# Patient Record
Sex: FEMALE | Race: OTHER | Employment: STUDENT | URBAN - METROPOLITAN AREA
[De-identification: names, ages, dates, MRNs, and addresses within clinical notes are randomized per-mention and may not be internally consistent; named-entity substitution may affect disease eponyms.]

---
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
## 2025-02-24 ENCOUNTER — PATIENT MESSAGE (OUTPATIENT)
Dept: OBGYN CLINIC | Facility: CLINIC | Age: 20
End: 2025-02-24

## 2025-02-25 NOTE — TELEPHONE ENCOUNTER
Patient sent message stating she can not make 3/8/25 appointment, patient requesting earlier in the week or the week of 3/10    To MA supervisor Roula

## 2025-02-25 NOTE — TELEPHONE ENCOUNTER
Dr. Nunn,   Patient is unable to make appointment time on 3/8. She is requesting either earlier in the week or the following week. Only New OB appointments available or AM 3/12 while on call. Please advise where patient can be added.

## 2025-03-06 ENCOUNTER — OFFICE VISIT (OUTPATIENT)
Dept: OBGYN CLINIC | Facility: CLINIC | Age: 20
End: 2025-03-06

## 2025-03-06 VITALS
HEIGHT: 64 IN | HEART RATE: 77 BPM | WEIGHT: 161 LBS | DIASTOLIC BLOOD PRESSURE: 83 MMHG | BODY MASS INDEX: 27.49 KG/M2 | SYSTOLIC BLOOD PRESSURE: 135 MMHG

## 2025-03-06 DIAGNOSIS — Z01.411 ENCOUNTER FOR GYNECOLOGICAL EXAMINATION WITH ABNORMAL FINDING: Primary | ICD-10-CM

## 2025-03-06 DIAGNOSIS — Z30.41 ORAL CONTRACEPTIVE PILL SURVEILLANCE: ICD-10-CM

## 2025-03-06 PROCEDURE — 99395 PREV VISIT EST AGE 18-39: CPT | Performed by: OBSTETRICS & GYNECOLOGY

## 2025-03-06 PROCEDURE — 3075F SYST BP GE 130 - 139MM HG: CPT | Performed by: OBSTETRICS & GYNECOLOGY

## 2025-03-06 PROCEDURE — 3008F BODY MASS INDEX DOCD: CPT | Performed by: OBSTETRICS & GYNECOLOGY

## 2025-03-06 PROCEDURE — 3079F DIAST BP 80-89 MM HG: CPT | Performed by: OBSTETRICS & GYNECOLOGY

## 2025-03-06 RX ORDER — DROSPIRENONE AND ETHINYL ESTRADIOL 0.03MG-3MG
1 KIT ORAL DAILY
Qty: 112 TABLET | Refills: 3 | Status: SHIPPED | OUTPATIENT
Start: 2025-03-06

## 2025-03-06 NOTE — PROGRESS NOTES
Felicity Martinez is a 19 year old female  Patient's last menstrual period was 2025 (exact date).   Chief Complaint   Patient presents with    Gyn Exam     Annual, ocp refill.     Reviewed Preventative/Wellness form with patient.   .  History of Present Illness  Felicity Martinez is a 19 year old female who presents for annual gyne exam & refill of her birth control pills.    She experiences severe headaches and nausea, which she describes as migraines, typically occurring before or during her menstrual period.  No visual disturbances are associated with the headaches, which she describes as 'just a bad headache' accompanied by 'really bad nausea.'    The symptoms occur specifically during the week of her period, suggesting a possible link to hormonal changes. She questions if the symptoms could be related to her birth control pill. She has been on the same birth control pill without any changes from the pharmacy, and she reports that it is working well.    She is a psychology major and works as a  at [employer]. She has a background in dance, having danced for fourteen years, particularly enjoying contemporary and jazz.       OBSTETRICS HISTORY:     OB History    Para Term  AB Living   0 0 0 0 0 0   SAB IAB Ectopic Multiple Live Births   0 0 0 0 0       GYNE HISTORY:     Periods regular monthly      BCM:  OCP    History   Sexual Activity    Sexual activity: Not Currently                    No data to display                  MEDICAL HISTORY:     Past Medical History:    Asthma (HCC)    Astigmatism    OU     Past Surgical History:   Procedure Laterality Date    Other surgical history      R elbow surgery     OB History    Para Term  AB Living   0 0 0 0 0 0   SAB IAB Ectopic Multiple Live Births   0 0 0 0 0        SOCIAL HISTORY:     Tobacco Use: Low Risk  (3/6/2025)    Patient History     Smoking Tobacco Use: Never     Smokeless Tobacco  Use: Never     Passive Exposure: Not on file       FAMILY HISTORY:     Family History   Problem Relation Age of Onset    Hypertension Father     Hypertension Maternal Grandmother     Pancreatic Cancer Maternal Grandfather     Diabetes Paternal Grandmother     Diabetes Paternal Grandfather     Breast Cancer Other     Heart Disorder Neg     Glaucoma Neg        MEDICATIONS:       Current Outpatient Medications:     drospirenone-ethinyl estradiol 3-0.03 MG Oral Tab, Take 1 tablet by mouth daily. Skip placebo pills for first 3 packs & take w/ fourth, Disp: 112 tablet, Rfl: 3    Fexofenadine-Pseudoephed -240 MG Oral Tablet 24 Hr, Take 1 tablet by mouth daily. (Patient not taking: Reported on 3/6/2025), Disp: , Rfl:     ALLERGIES:     Allergies[1]      REVIEW OF SYSTEMS:     Constitutional:    denies fever / chills  Eyes:     denies blurred or double vision  Cardiovascular:  denies chest pain or palpitations  Respiratory:    denies shortness of breath  Gastrointestinal:  denies severe abdominal pain, frequent diarrhea or constipation, nausea / vomiting  Genitourinary:    denies dysuria, bothersome incontinence  Skin/Breast:   denies any breast pain, lumps, or discharge  Neurological:    denies frequent severe headaches  Psychiatric:   denies depression or anxiety, thoughts of harming self or others  Heme/Lymph:    denies easy bruising or bleeding      PHYSICAL EXAM:     Blood pressure 135/83, pulse 77, height 5' 4\" (1.626 m), weight 161 lb (73 kg), last menstrual period 03/06/2025, not currently breastfeeding.  Physical Exam  GENITOURINARY: Uterus normal on bimanual exam  Constitutional:  well developed, well nourished  Head/Face:  normocephalic  Neck/Thyroid: thyroid symmetric, no thyromegaly, no nodules, no adenopathy  Lymphatic: no abnormal supraclavicular or axillary adenopathy is noted  Breast:   normal without palpable masses, tenderness, asymmetry, nipple discharge, nipple retraction or skin changes  Abdomen:    soft, nontender, nondistended, no masses  Skin/Hair:  no unusual rashes or bruises  Extremities:  no edema, no cyanosis  Psychiatric:   oriented to time, place, person and situation. Appropriate mood and affect    Pelvic Exam:  External Genitalia:  normal appearance, hair distribution, and no lesions  Urethral Meatus:   normal in size, location, without lesions and prolapse  Bladder:    no fullness, masses or tenderness  Vagina:    normal appearance without lesions, no abnormal discharge  Cervix:    normal without tenderness on motion  Uterus:    normal in size, contour, position, mobility, without tenderness  Adnexa:   normal without masses or tenderness  Perineum:   normal  Anus: no hemorroids     Results        ASSESSMENT & PLAN:     Felicity was seen today for gyn exam.    Diagnoses and all orders for this visit:    Encounter for gynecological examination with abnormal finding    Oral contraceptive pill surveillance    Other orders  -     drospirenone-ethinyl estradiol 3-0.03 MG Oral Tab; Take 1 tablet by mouth daily. Skip placebo pills for first 3 packs & take w/ fourth        Assessment & Plan  Menstrual Migraines    Experiencing severe headaches and nausea typically before or during menstruation, likely due to hormonal shifts. No visual disturbances. Discussed continuous birth control to reduce migraine frequency.    - Switch to continuous birth control, skipping the sugar pill week for three consecutive packs and taking sugar pills in the fourth pack to reduce migraines to four times a year.    -  four packs of birth control pills from the pharmacy and label them 1, 2, 3, and 4, with the fourth pack for sugar pills.      Sexual Health    Sexually active with no current concerns. Pelvic exam performed, ovaries normal.    - Pelvic exam performed, ovaries normal.      General Health Maintenance    On birth control pills, effective. Received Gardasil vaccine. No changes in medical or family history. Pap  smears to start at age 21. Discussed STD testing, declined. Advised to notify if period needs to be skipped for future Pap smear appointments.    - Continue current birth control regimen.    - No Pap smear needed today due to age  - Notify if period needs to be skipped for future Pap smear appointments.      SUMMARY:  PAP:  next pap at 21 per ASCCP guidelines.  BCM: OCP  STD screening: declines, condoms encouraged  Gardasil: received  HM updated  Depression screen:   Depression Screening (PHQ-2/PHQ-9): Over the LAST 2 WEEKS   Little interest or pleasure in doing things (over the last two weeks)?: Not at all    Feeling down, depressed, or hopeless (over the last two weeks)?: Not at all    PHQ-2 SCORE: 0          FOLLOWUP:     Return in about 1 year (around 3/6/2026) for annual gyne exam, medication refill.    Note to patient and family:  The 21st Century Cures Act makes medical notes available to patients in the interest of transparency.  However, please be advised that this is a medical document.  It is intended as a peer to peer communication.  It is written in medical language and may contain abbreviations or verbiage that are technical and unfamiliar.  It may appear blunt or direct.  Medical documents are intended to carry relevant information, facts as evident, and the clinical opinion of the practitioner.       [1]   Allergies  Allergen Reactions    Seasonal ITCHING     Eyes and palms and stuffy nose

## 2025-03-06 NOTE — PROGRESS NOTES
The following individual(s) verbally consented to be recorded using ambient AI listening technology and understand that they can each withdraw their consent to this listening technology at any point by asking the clinician to turn off or pause the recording:    Patient name: Felicity Martinez

## (undated) NOTE — LETTER
4/18/2023              Grant Cannon        [address]         To Whom It May Concern,      The patient should be excused from gym until cleared by the specialist she will be seeing soon. Thank you.       Sincerely,       Cleo Deleon MD  [facility], [address], [address]  [phone]        Document electronically generated by:  Cleo Deleon MD

## (undated) NOTE — LETTER
8/14/2019              Shae Tse        [address]        [facility] [address]       SCHOOL MEDICATION PERMISSION FORM    SCHOOL DISTRICT:      TO BE COMPLETED IN DETAIL BY THE PARENT/GUARDIAN:    STUDENT'S NAME:  Elías Hartman

## (undated) NOTE — MR AVS SNAPSHOT
[address], [address] [facility]  [address] [facility] [address] [facility]  [phone]               Thank you for choosing us for your health care visit with Umm Randhawa MD.  We are glad to serve you and happy to provide yo Inhale 2 puffs by inhalation route every 4 hours as needed. Please dispense for 90 day supply.    Commonly known as:  PROAIR HFA           * albuterol sulfate (2.5 MG/3ML) 0.083% Nebu   Take 3 mL (2.5 mg total) by nebulization every 4 (four) hours as needed

## (undated) NOTE — LETTER
Date: 10/25/2023    Patient Name: Silvio Fortune      To Whom it may concern: This letter has been written at the patient's request. The above patient was seen at the [facility] for treatment of a medical condition. This patient can return school and extracurricular activities, as tolerated, with no restrictions. Sincerely,          VITO Curran, PATRICE Orthopedic Surgery / Sports Medicine Specialist  [facility]  [address], [address]   [email or web address]  [email or web address]  t: [phone]  o: [phone]  f: [phone]        This note was dictated using Dragon software. While it was briefly proofread prior to completion, some grammatical, spelling, and word choice errors due to dictation may still occur.

## (undated) NOTE — LETTER
Name:  Carlos Rodrigues Year:  8th Grade Class: Student ID No.:   Address:  [address] [place] [address] Phone:  [phone] (home)  :  15year old   Name Relationship [address] Work Phone Home Phone Mobile Phone   1.  [facility] unexpected or unexplained sudden death before age 48?     15. Does anyone in your family have hypertrophic cardiomyopathy, Marfan syndrome, arrhythmogenic right ventricular cardiomyopathy, long QT syndrome, short QT syndrome, Brugada syndrome, or catechola 29. Have you ever had a head injury or concussion? 35. Have you ever had a hit or blow to the head that caused confusion, prolonged headache, or memory problems? 36. Do you have a history of seizure disorder?     37.  Do you have headaches with exer Vision: R 20/    L 20/   Corrected:   Yes/No   MEDICAL NORMAL ABNORMAL FINDINGS   Appearance:  Marfan stigmata (kyphoscoliosis, high-arched palate, pectus excavatum,      arachnodactyly, arm span > height, hyperlaxity, myopia, MVP, aortic insufficiency) Kyra Boxer (This section for high school students only)   9246-7624 school term     As a prerequisite to participation in [employer] athletic activities, we agree that I/our student will not use performance-enhancing substances as defined in the [employer] Performance-Enhancing

## (undated) NOTE — Clinical Note
VACCINE ADMINISTRATION RECORD  PARENT / GUARDIAN APPROVAL  Date: 6/15/2017  Vaccine administered to: Vanna Laogs     : 2005    MRN: [id number]    A copy of the appropriate Centers for Disease Control and Prevention Vaccine Information statemen

## (undated) NOTE — Clinical Note
[facility] Examination       Student's Name  [place] Birth Da Title                           Date    (If adding dates to the above immunization history section, put your initials by date(s) and sign here.)   ALTERNATIVE PROOF OF IMMUNITY   1 Diagnosis of asthma? Child wakes during the night coughing   Yes   No    Yes   No    Loss of function of one of paired organs? (eye/ear/kidney/testicle)   Yes   No      Birth Defects? Developmental delay? Yes   No    Yes   No  Hospitalizations? When? Signs of Insulin Resistance (hypertension, dyslipidemia, polycystic ovarian syndrome, acanthosis nigricans)                           At Risk     Lead Risk Questionnaire  Req'd for children 6 months thru 6 yrs enrolled in licensed or public Respiratory {YES:829::\"Yes\"}                   Diagnosis of Asthma: {NO:830::\"No\"} Mental Health {YES:829::\"Yes\"}        Currently Prescribed Asthma Medication:            Quick-relief  medication (e.g. Short Acting Beta Antagonist): {NO:830::\"No\"}

## (undated) NOTE — ED AVS SNAPSHOT
Parent/Legal Guardian Access to the Online Si[employer] Record of a Patient 15to 16Years Old  Return completed form by Secure email to [place] HIM/Medical Records Department: [email or web address]     Requirements and Procedures   Under [facility] ·  I understand that [address] is intended as a secure online source of confidential medical information.  If I share my MyChart ID and password with another person, that person may be able to view my or my child’s health information, and health information a · This form does not substitute as an Authorization to Release health information to a designated proxy by any other method. The purpose of this Minor Proxy form is for access to the [employer] portal information.     By signing below, I acknowledge that I ha I have read and understand the requirements and procedures for accessing my child’s medical record information online as provided  on page one of this document titled, Parent/Legal Guardian Access to the Online MyChart Record of a Patient [address] to [address]

## (undated) NOTE — LETTER
?   PREPARTICIPATION PHYSICAL EVALUATION  MEDICAL ELIGIBILITY FORM  [] Medically eligible for all sports without restrictions   [] Medically eligible for all sports without restriction with recommendations for further evaluation or treatment     []Medically Shortness of breath or difficulty breathing Yes  No    d)      Fatigue Yes  No    e)      Muscle or body aches Yes  No    f)       Headache Yes  No    g)      New loss of taste or smell Yes  No    h)      Sore throat Yes  No    i)       Congestion or runny Journal of Sport Medicine ([email or web address])  • Supplemental COVID?19 Questions ([email or web address].[email or web address])  • COVID?19 Interim Guidance: Return to Sports and Physical Activity ([email or web address])    ?   PREPARTICIPATION PHYSICAL EVALUATION   HISTORY FORM  Note: Complete and sign this form (wi sports for any reason? [] [] 3. Do you have any ongoing medical issues or recent illnesses? [] []   HEART HEALTH QUESTIONS ABOUT YOU Yes No 4. Have you ever passed out or nearly passed out during or after exercise? [] [] 5.    Have you ever had discomf after exercise? [] []   17. Are you missing a kidney, an eye, a testicle (males), your spleen, or any other organ? [] []   18. Do you have groin or testicle pain or a painful bulge or hernia in the groin area? [] []   19.    Do you have any recurring sk I hereby state that, to the best of my knowledge, my answers to the questions on this form are complete and correct.     Signature of athlete:____________________________________________________________________________________________  [employer] of tigist of methicillin-resistant Staphylococcus aureus (MRSA), or tinea corporis   [x]  []   Neurological   [x]  []   MUSCULOSKELETAL NORMAL ABNORMAL FINDINGS   Neck   [x]  []    Back   [x]  []   Shoulder and arm   [x]  []     Elbow and forearm   [x]  []     Wrist

## (undated) NOTE — LETTER
3/14/2022              Shawnee Miles         To Whom It May Concern,    Please excuse Yolanda Eugene from gym this week and next week due to back pain from a strain. She can return to gym 3/28. Please call with any questions.           Sincerely,       MD Donn Hill Ap[place]orn, [address] [facility]  [phone]        Document electronically generated by:  John Adams MD

## (undated) NOTE — ED AVS SNAPSHOT
[facility] AND [facility] in [facility] 18.  [address] [facility] [place]    Phone:  [phone]    Fax:  [phone]           Sharon Marvin   MRN: [id number]    Department:  [facility] AND [facility] in [address] [place] [address]   Date of Visit: not participate in your health insurance plan. This may result in a lower benefit level being available to you or other limited reimbursement.   The physician may seek payment directly from you for amounts other than your deductible, co-payment, or co-insu prescription right away and begin taking the medication(s) as directed.   If you believe that any of the medications or instructions on this list is different from what your Primary Care doctor has instructed you - please continue to take your medications a - If you don’t have insurance, Ismael Orta has partnered with [employer] to help you get signed up for insurance coverage.   [employer] [facility] is a Federal Navigator program that can help with your Affordable Care Act cover

## (undated) NOTE — LETTER
VACCINE ADMINISTRATION RECORD  PARENT / GUARDIAN APPROVAL  Date: 2018  Vaccine administered to: Marcell Muir     : 2005    MRN: [id number]    A copy of the appropriate Centers for Disease Control and Prevention Vaccine Information statemen

## (undated) NOTE — LETTER
Date: 10/9/2023    Patient Name: Max New      To Whom it may concern: This letter has been written at the patient's request. The above patient was seen at the [facility] for treatment of a medical condition. This patient can return to dance as tolerated with gradual progression of activities. Sincerely,          Fanny Severance, MMS, PATRICE Orthopedic Surgery / Sports Medicine Specialist  [facility]  [address], [address]   Ivory Del [email or web address]. [email or web address]  t: [phone]  o: [phone]  f: [phone]        This note was dictated using Dragon software. While it was briefly proofread prior to completion, some grammatical, spelling, and word choice errors due to dictation may still occur.

## (undated) NOTE — LETTER
[facility] [employer] of Child Health Examination       Student's Name  Stephanie Berry Birth D Signature                                                                                                                   Title      MD                     Date  8/14/2019   Signature Grade Level/ID#  9th Grade   HEALTH HISTORY          TO BE COMPLETED AND SIGNED BY PARENT/GUARDIAN AND VERIFIED BY HEALTH CARE PROVIDER    ALLERGIES  (Food, drug, insect, other)  Patient has no known allergies.  MEDICATION  (List all prescribed or taken on appropriate personnel for health /educational purposes. Bone/Joint problem/injury/scoliosis?    Yes   No  Parent/Guardian Signature                                          Date     PHYSICAL EXAMINATION REQUIREMENTS    Entire section below to be completed Ears Yes                      Screen result: Gastrointestinal Yes    Eyes Yes     Screen result:   Genito-Urinary Yes  LMP   Nose Yes  Neurological Yes    Throat Yes  Musculoskeletal Yes    Mouth/Dental Yes  Spinal examination Yes    Cardiovascular/HTN Yes Rev 11/15                                                                    Printed by the [employer]

## (undated) NOTE — Clinical Note
5/8/2017              Pilar Mer[address] [place] [address]         To Whom It May Concern,    Rachid Herring may return to full activity in gym and dance beginning tomorrow, 5/9/17.     Sincerely,    Freddy Ayoub MD

## (undated) NOTE — LETTER
VACCINE ADMINISTRATION RECORD  PARENT / GUARDIAN APPROVAL  Date: 3/28/2023  Vaccine administered to: Camille Townsend     : 2005    MRN: [id number]    A copy of the appropriate Centers for Disease Control and Prevention Vaccine Information statement has been provided. I have read or have had explained the information about the diseases and the vaccines listed below. There was an opportunity to ask questions and any questions were answered satisfactorily. I believe that I understand the benefits and risks of the vaccine cited and ask that the vaccine(s) listed below be given to me or to the person named above (for whom I am authorized to make this request). VACCINES ADMINISTERED:  Menveo    I have read and hereby agree to be bound by the terms of this agreement as stated above. My signature is valid until revoked by me in writing. This document is signed by  , relationship: Mother on 3/28/2023.:                                                                                                                                         Parent / Danladonna Lennon                                                Date    Alexandria Lopez served as a witness to authentication that the identity of the person signing electronically is in fact the person represented as signing. This document was generated by Alexandria Lopez on 3/28/2023.

## (undated) NOTE — LETTER
VACCINE ADMINISTRATION RECORD  PARENT / GUARDIAN APPROVAL  Date: 2019  Vaccine administered to: Emmett Nolan     : 2005    MRN: [id number]    A copy of the appropriate Centers for Disease Control and Prevention Vaccine Information statemen

## (undated) NOTE — LETTER
Date: 7/28/2023    Patient Name: Paige Holbrook          To Whom it may concern: This letter has been written at the patient's request. The above patient was seen at the [facility] for treatment of a medical condition. This patient can return to school. Please allow extra time between classes, avoid physical education and sports activity until further notice, avoid crowded hallways, allow elevator use, and assistance with books/lunch and carrying items. Please accommodate accordingly. Sincerely,          VITO Bruce, PABulmaroC Orthopedic Surgery / Sports Medicine Specialist  [facility]  [address], [address]   [email or web address]  [email or web address]  t: [phone]  o: [phone]  f: [phone]          This note was dictated using Dragon software. While it was briefly proofread prior to completion, some grammatical, spelling, and word choice errors due to dictation may still occur.

## (undated) NOTE — ED AVS SNAPSHOT
Parent/Legal Guardian Access to the Online Recochem Record of a Patient 15to 16Years Old  Return completed form by Secure email to [place] HIM/Medical Records Department: [email or web address]     Requirements and Procedures   Under [facility] ·  I understand that Ivania Samuel is intended as a secure online source of confidential medical information.  If I share my Searchbox[employer] ID and password with another person, that person may be able to view my or my child’s health information, and health information a · This form does not substitute as an Authorization to Release health information to a designated proxy by any other method. The purpose of this Minor Proxy form is for access to the [employer] portal information.     By signing below, I acknowledge that I ha I have read and understand the requirements and procedures for accessing my child’s medical record information online as provided  on page one of this document titled, Parent/Legal Guardian Access to the Online MyChart Record of a Patient 12 to [address]

## (undated) NOTE — LETTER
Date & Time: 8/2/2018, 9:23 AM  Patient: Rey Underwood  Encounter Provider(s):    Juaquin Frankel, MD       To Whom It May Concern:    Rey Underwood was seen and treated in our department on 8/2/2018.  She May not participate in dance for at least th

## (undated) NOTE — LETTER
ASTHMA ACTION PLAN for Brian Pathak     : 2005     Date: 19  Doctor:  Beverly Pete MD  Phone for doctor or clinic: [address] [place], [place] [address] [facility]  [phone] Albuterol inhaler 2 puffs every 20 minutes for three treatments       Don't forget:  · Rinse mouth after using inhaler  · Use spacer for inhaler  · Remember to get your Flu vaccine every fall!     [x] Asthma Action Plan reviewed with the caregiver and samantha

## (undated) NOTE — LETTER
ASTHMA ACTION PLAN for Kerry Sidhu     : 2005     Date: 18  Doctor:  Chris Goldberg MD  Phone for doctor or clinic: Donn Gallegos , [address], [place] [address] [facility],[address]  1200 [facility]  [phone]      [facility] Albuterol inhaler 2 puffs every 20 minutes for three treatments       Don't forget:  · Rinse mouth after using inhaler  · Use spacer for inhaler  · Remember to get your Flu vaccine every fall!     [x] Asthma Action Plan reviewed with the caregiver and samantha

## (undated) NOTE — MR AVS SNAPSHOT
[place]  [address]  [phone]               Thank you for choosing us for your health care visit with [facility], .   We are glad to serve you and happy to provide you with this summary of * Notice: This list has 2 medication(s) that are the same as other medications prescribed for you. Read the directions carefully, and ask your doctor or other care provider to review them with you.                Visit [employer] online at  [facility]

## (undated) NOTE — MR AVS SNAPSHOT
[address]  [address] [facility]  [phone]               Thank you for choosing us for your health care visit with Paulina Perdomo MD.  We are glad to serve you and happy to provide you with this summa · Life at home. How is your child’s behavior? Does he or she get along with others in the family? Is he or she respectful of you, other adults, and authority?  Does your child participate in family events, or does he or she withdraw from other family member changes, becoming lower and deeper. As the penis grows and matures, erections and “wet dreams” begin to occur. Reassure your son that this is normal.  · Emotional changes.  Along with these physical changes, you’ll likely notice changes in your child’s pers family time. It also lets you see what and how your child eats. · Pay attention to portions. Serve portions that make sense for your kids. Let them stop eating when they’re full—don’t make them clean their plates.  Be aware that many kids’ appetites increa to wear wrist guards, elbow pads, and knee pads. · In the car, all children younger than 13 should sit in the back seat. Children shorter than 4'9\" (57 inches) should continue to use a booster seat to properly position the seat belt.   · If your child has · Set limits for the use of cell phones, the computer, and the Internet. Remind your child that you can check the web browser history and cell phone logs to know how these devices are being used.  Use parental controls and passwords to block access to [employer] Albuterol Sulfate  (90 Base) MCG/ACT Aers   Inhale 2 puffs by inhalation route every 4 hours as needed. Please dispense for 90 day supply. What changed:  Another medication with the same name was removed.  Continue taking this medication, and foll

## (undated) NOTE — LETTER
[facility] [address], [facility]   Date:   4/25/2023     Name:   Josh Smith    YOB: 2005   MRN:   [id number]       WHERE IS YOUR PAIN NOW? Harman the areas on your body where you feel the described sensations. Use the appropriate symbol. Saundra Gomez the areas of radiation. Include all affected areas. Just to complete the picture, please draw in the face. ACHE:  ^ ^ ^   NUMBNESS:  0000   PINS & NEEDLES:  = = = =                              ^ ^ ^                       0000              = = = =                                    ^ ^ ^                       0000            = = = =      BURNING:  XXXX   STABBING: ////                  XXXX                ////                         XXXX          ////     Please harman the line below indicating your degree of pain right now  with 0 being no pain 10 being the worst pain possible.                                          0             1             2              3             4              5              6              7             8             9             10         Patient Signature:

## (undated) NOTE — LETTER
[facility] [facility] of Child Health Examination       Student's Name  Kinta Birth Da Signature                                                                                             Title      MD                     Date  6/22/2018   Signature 7th Grade   HEALTH HISTORY          TO BE COMPLETED AND SIGNED BY PARENT/GUARDIAN AND VERIFIED BY HEALTH CARE PROVIDER    ALLERGIES  (Food, drug, insect, other) MEDICATION  (List all prescribed or taken on a regular basis.)     Diagnosis of asthma?   Child DIABETES SCREENING  BMI>85% age/sex  No And any two of the following:  Family History No   Ethnic Minority  No          Signs of Insulin Resistance (hypertension, dyslipidemia, polycystic ovarian syndrome, acanthosis nigricans)    No           At Risk  No Quick-relief  medication (e.g. Short Acting Beta Antagonist): No          Controller medication (e.g. inhaled corticosteroid):   No Other   NEEDS/MODIFICATIONS required in the school setting  None DIETARY Needs/Restrictions     None   SPECIAL INSTR

## (undated) NOTE — Clinical Note
ASTHMA ACTION PLAN for Andrew Engel     : 2005     Date: 06/15/2017  Doctor:  Myah Melendez MD  Phone for doctor or clinic: [facility] [place] [address] [place] [address]  [address] [place]  [phone] If your symptoms do not improve in ONE hour -  go to the emergency room or call 911 immediately! If symptoms improve, call office for appointment immediately.     Albuterol inhaler 2 puffs every 20 minutes for three treatments       Don't forget:  · Rinse

## (undated) NOTE — LETTER
3/28/2023              [address] [facility] [address]         To Whom It May Concern,      Sincerely,    Chalino Mendez MD  [facility], [address], [address]  [phone]        Document electronically generated by:  Chalino Mendez MD

## (undated) NOTE — LETTER
[facility] [employer] of Child Health Examination       Student's Name  Maye Sharif D Signature                                                                                                            Title     MD                      Date  9/17/2020   Signature Level/ID#  9th Grade   HEALTH HISTORY          TO BE COMPLETED AND SIGNED BY PARENT/GUARDIAN AND VERIFIED BY HEALTH CARE PROVIDER    ALLERGIES  (Food, drug, insect, other) MEDICATION  (List all prescribed or taken on a regular basis.)     Diagnosis of asth BMI 23.32 kg/m²     DIABETES SCREENING  BMI>85% age/sex  No And any two of the following:  Family History No   Ethnic Minority  No          Signs of Insulin Resistance (hypertension, dyslipidemia, polycystic ovarian syndrome, acanthosis nigricans)    No medication (e.g. Short Acting Beta Antagonist): No          Controller medication (e.g. inhaled corticosteroid):   No Other   NEEDS/MODIFICATIONS required in the school setting  None DIETARY Needs/Restrictions     None   SPECIAL INSTRUCTIONS/DEVICES e.g. s

## (undated) NOTE — LETTER
Date & Time: 11/3/2019, 12:50 PM  Patient: Katty Day  Encounter Provider(s):    Shante Clayton MD       To Whom It May Concern:    Katty Day was seen and treated in our department on 11/3/2019.  She should not participate in gym/sports unti

## (undated) NOTE — LETTER
8/10/2018              Kathe Keenan        [place] [facility] [address]         To Whom It May Concern,      Patient had ankle sprain and she is now better. and may resume all her activities    Sincerely,      Travis Stallings,

## (undated) NOTE — LETTER
23          Obeypatrick Ashlyn  :  2005      To Whom It May Concern: This patient was seen in our office on 23 . She is able to return to gym at full capacity without limitation. May return to work status per above effective 23. If this office may be of further assistance, please do not hesitate to contact us.       Sincerely,        Joy Whiteside DO  Physical Medicine and Rehabilitation / [facility] [address]

## (undated) NOTE — LETTER
Name:   Wilian Year:  10th Grade Class: Student ID No.:   Address:  [facility] [employer] [place] [address] Phone:  [phone] (home)  :  13year old   Name Relationship [address] Work Phone Home Phone Mobile Phone   1.  MON HEALTH QUESTIONS ABOUT YOUR FAMILY Yes No   13.  Has any family member or relative  of heart problems or had an unexpected or unexplained sudden death before age 48?     15. Does anyone in your family have hypertrophic cardiomyopathy, Marfan syndrome, a skin problems? 35. Have you had a herpes or MRSA skin infection? 29. Have you ever had a head injury or concussion? 35. Have you ever had a hit or blow to the head that caused confusion, prolonged headache, or memory problems? 36.  Do you ha Appearance:  Marfan stigmata (kyphoscoliosis, high-arched palate, pectus excavatum,      arachnodactyly, arm span > height, hyperlaxity, myopia, MVP, aortic insufficiency) Yes    Eyes/Ears/Nose/Throat:  Pupils equal    Hearing Yes    Lymph nodes Yes    H [employer] athletic activities, we agree that I/our student will not use performance-enhancing substances as defined in the [facility] Performance-Enhancing Substance Testing Program Protocol.  We have reviewed the policy and understand that I/our student may be asked

## (undated) NOTE — MR AVS SNAPSHOT
[address], [address] [place] [address] [facility]  [phone]               Thank you for choosing us for your health care visit with Bentley Valdivia DO.   We are glad to serve you and happy to provide you Where to Get Your Medications      These medications were sent to [id number] [facility], [facility] [place], [address] [place] [address],[address], [address] [facility] [address]     Phone:  [phone] - amoxicillin o Limiting fast food, take out food, and eating out at restaurants  o Preparing foods at home as a family  o Eating a diet rich in calcium  o Eating a high fiber diet    Help your children form healthy habits.   Healthy active children are more likely to be

## (undated) NOTE — LETTER
[address]  [facility] 5  Dept: [phone]  Dept Fax: [phone]  Loc: [phone]      April 30, 2017    Patient: Delia Grover   Date of Visit: 4/30/2017       To Whom It May Concern:    Cammie Harrington

## (undated) NOTE — LETTER
23          Regina Sarah  :  2005      To Whom It May Concern: This patient was seen in our office on 23 . Sport/School status:   Patient will remain out of gym and dance until workup is complete. She should avoid heavy lifting, significant bending of the spine, kneeling as much as possible. Status per above effective 23. If this office may be of further assistance, please do not hesitate to contact us.       Sincerely,        Mahsa Suresh DO  Physical Medicine and Rehabilitation / [address] [place] [address]

## (undated) NOTE — LETTER
[facility] [employer] of Child Health Examination       Student's Name  Vineet Khalil D Signature    ***                                                                                                                               Title                           Date  08/14/19     Signature 11/28/2005  Sex  Female School   Grade Level/ID#      HEALTH HISTORY          TO BE COMPLETED AND SIGNED BY PARENT/GUARDIAN AND VERIFIED BY HEALTH CARE PROVIDER    ALLERGIES  (Food, drug, insect, other)  Patient has no known allergies.  MEDICATION  (List al Ear/Hearing problems? Yes   No  Information may be shared with appropriate personnel for health /educational purposes. Bone/Joint problem/injury/scoliosis?    Yes   No  Parent/Guardian Signature                                          Date     PHYSICAL Comments/Follow-up/Needs   Skin Yes  Endocrine Yes    Ears Yes                      Screen result: Gastrointestinal Yes    Eyes Yes     Screen result:   Genito-Urinary Yes  LMP   Nose Yes  Neurological Yes    Throat Yes  Musculoskeletal Yes    Mouth/Dental [facility] [address]  [phone]   Rev 11/15                                                                    Printed by the [employer]

## (undated) NOTE — LETTER
Name:  Janelle Paniagua Year:  8th Grade Class: Student ID No.:   Address:  [address] [place] [address] Phone:  [phone] (home)  :  15year old   Name Relationship [address] Work Phone Home Phone Mobile Phone   1.  [facility] unexpected or unexplained sudden death before age 48?     15. Does anyone in your family have hypertrophic cardiomyopathy, Marfan syndrome, arrhythmogenic right ventricular cardiomyopathy, long QT syndrome, short QT syndrome, Brugada syndrome, or catechola 29. Have you ever had a head injury or concussion? 35. Have you ever had a hit or blow to the head that caused confusion, prolonged headache, or memory problems? 36. Do you have a history of seizure disorder?     37.  Do you have headaches with exer Vision: R 20/    L 20/   Corrected:   Yes/No   MEDICAL NORMAL ABNORMAL FINDINGS   Appearance:  Marfan stigmata (kyphoscoliosis, high-arched palate, pectus excavatum,      arachnodactyly, arm span > height, hyperlaxity, myopia, MVP, aortic insufficiency) Kylah Bower (This section for high school students only)   6800-6538 school term     As a prerequisite to participation in [employer] athletic activities, we agree that I/our student will not use performance-enhancing substances as defined in the [employer] Performance-Enhancing